# Patient Record
Sex: MALE | NOT HISPANIC OR LATINO | Employment: FULL TIME | URBAN - NONMETROPOLITAN AREA
[De-identification: names, ages, dates, MRNs, and addresses within clinical notes are randomized per-mention and may not be internally consistent; named-entity substitution may affect disease eponyms.]

---

## 2019-10-01 ENCOUNTER — NON-PROVIDER VISIT (OUTPATIENT)
Dept: URGENT CARE | Facility: PHYSICIAN GROUP | Age: 49
End: 2019-10-01

## 2019-10-01 DIAGNOSIS — Z11.1 PPD SCREENING TEST: Primary | ICD-10-CM

## 2019-10-01 PROCEDURE — 86580 TB INTRADERMAL TEST: CPT | Performed by: PHYSICIAN ASSISTANT

## 2019-10-03 ENCOUNTER — NON-PROVIDER VISIT (OUTPATIENT)
Dept: URGENT CARE | Facility: PHYSICIAN GROUP | Age: 49
End: 2019-10-03

## 2019-10-03 LAB — TB WHEAL 3D P 5 TU DIAM: NORMAL MM

## 2022-02-02 ENCOUNTER — HOSPITAL ENCOUNTER (OUTPATIENT)
Dept: LAB | Facility: MEDICAL CENTER | Age: 52
End: 2022-02-02
Attending: INTERNAL MEDICINE
Payer: COMMERCIAL

## 2022-02-02 LAB
ALBUMIN SERPL BCP-MCNC: 4.6 G/DL (ref 3.2–4.9)
ALBUMIN/GLOB SERPL: 1.4 G/DL
ALP SERPL-CCNC: 100 U/L (ref 30–99)
ALT SERPL-CCNC: 28 U/L (ref 2–50)
ANION GAP SERPL CALC-SCNC: 9 MMOL/L (ref 7–16)
AST SERPL-CCNC: 29 U/L (ref 12–45)
BASOPHILS # BLD AUTO: 1.1 % (ref 0–1.8)
BASOPHILS # BLD: 0.07 K/UL (ref 0–0.12)
BILIRUB SERPL-MCNC: 0.2 MG/DL (ref 0.1–1.5)
BUN SERPL-MCNC: 11 MG/DL (ref 8–22)
CALCIUM SERPL-MCNC: 9.3 MG/DL (ref 8.5–10.5)
CHLORIDE SERPL-SCNC: 102 MMOL/L (ref 96–112)
CHOLEST SERPL-MCNC: 316 MG/DL (ref 100–199)
CO2 SERPL-SCNC: 26 MMOL/L (ref 20–33)
CREAT SERPL-MCNC: 1.01 MG/DL (ref 0.5–1.4)
EOSINOPHIL # BLD AUTO: 0.4 K/UL (ref 0–0.51)
EOSINOPHIL NFR BLD: 6.2 % (ref 0–6.9)
ERYTHROCYTE [DISTWIDTH] IN BLOOD BY AUTOMATED COUNT: 47.5 FL (ref 35.9–50)
FASTING STATUS PATIENT QL REPORTED: NORMAL
GLOBULIN SER CALC-MCNC: 3.4 G/DL (ref 1.9–3.5)
GLUCOSE SERPL-MCNC: 102 MG/DL (ref 65–99)
HCT VFR BLD AUTO: 56 % (ref 42–52)
HDLC SERPL-MCNC: 47 MG/DL
HGB BLD-MCNC: 19.3 G/DL (ref 14–18)
IMM GRANULOCYTES # BLD AUTO: 0.03 K/UL (ref 0–0.11)
IMM GRANULOCYTES NFR BLD AUTO: 0.5 % (ref 0–0.9)
LDLC SERPL CALC-MCNC: 232 MG/DL
LYMPHOCYTES # BLD AUTO: 2.15 K/UL (ref 1–4.8)
LYMPHOCYTES NFR BLD: 33.4 % (ref 22–41)
MCH RBC QN AUTO: 34.8 PG (ref 27–33)
MCHC RBC AUTO-ENTMCNC: 34.5 G/DL (ref 33.7–35.3)
MCV RBC AUTO: 101.1 FL (ref 81.4–97.8)
MONOCYTES # BLD AUTO: 0.64 K/UL (ref 0–0.85)
MONOCYTES NFR BLD AUTO: 10 % (ref 0–13.4)
NEUTROPHILS # BLD AUTO: 3.14 K/UL (ref 1.82–7.42)
NEUTROPHILS NFR BLD: 48.8 % (ref 44–72)
NRBC # BLD AUTO: 0 K/UL
NRBC BLD-RTO: 0 /100 WBC
PLATELET # BLD AUTO: 209 K/UL (ref 164–446)
PMV BLD AUTO: 11.1 FL (ref 9–12.9)
POTASSIUM SERPL-SCNC: 4.8 MMOL/L (ref 3.6–5.5)
PROT SERPL-MCNC: 8 G/DL (ref 6–8.2)
RBC # BLD AUTO: 5.54 M/UL (ref 4.7–6.1)
SODIUM SERPL-SCNC: 137 MMOL/L (ref 135–145)
TRIGL SERPL-MCNC: 185 MG/DL (ref 0–149)
TSH SERPL DL<=0.005 MIU/L-ACNC: 4.53 UIU/ML (ref 0.38–5.33)
WBC # BLD AUTO: 6.4 K/UL (ref 4.8–10.8)

## 2022-02-02 PROCEDURE — 36415 COLL VENOUS BLD VENIPUNCTURE: CPT

## 2022-02-02 PROCEDURE — 80061 LIPID PANEL: CPT

## 2022-02-02 PROCEDURE — 85025 COMPLETE CBC W/AUTO DIFF WBC: CPT

## 2022-02-02 PROCEDURE — 80053 COMPREHEN METABOLIC PANEL: CPT

## 2022-02-02 PROCEDURE — 84443 ASSAY THYROID STIM HORMONE: CPT

## 2023-02-06 ENCOUNTER — HOSPITAL ENCOUNTER (OUTPATIENT)
Dept: LAB | Facility: MEDICAL CENTER | Age: 53
End: 2023-02-06
Attending: INTERNAL MEDICINE
Payer: COMMERCIAL

## 2023-02-06 LAB
ALBUMIN SERPL BCP-MCNC: 4 G/DL (ref 3.2–4.9)
ALBUMIN/GLOB SERPL: 1.3 G/DL
ALP SERPL-CCNC: 89 U/L (ref 30–99)
ALT SERPL-CCNC: 22 U/L (ref 2–50)
ANION GAP SERPL CALC-SCNC: 9 MMOL/L (ref 7–16)
AST SERPL-CCNC: 28 U/L (ref 12–45)
BASOPHILS # BLD AUTO: 1 % (ref 0–1.8)
BASOPHILS # BLD: 0.06 K/UL (ref 0–0.12)
BILIRUB SERPL-MCNC: <0.2 MG/DL (ref 0.1–1.5)
BUN SERPL-MCNC: 19 MG/DL (ref 8–22)
CALCIUM ALBUM COR SERPL-MCNC: 9.1 MG/DL (ref 8.5–10.5)
CALCIUM SERPL-MCNC: 9.1 MG/DL (ref 8.5–10.5)
CHLORIDE SERPL-SCNC: 102 MMOL/L (ref 96–112)
CHOLEST SERPL-MCNC: 134 MG/DL (ref 100–199)
CO2 SERPL-SCNC: 28 MMOL/L (ref 20–33)
CREAT SERPL-MCNC: 0.93 MG/DL (ref 0.5–1.4)
EOSINOPHIL # BLD AUTO: 0.28 K/UL (ref 0–0.51)
EOSINOPHIL NFR BLD: 4.4 % (ref 0–6.9)
ERYTHROCYTE [DISTWIDTH] IN BLOOD BY AUTOMATED COUNT: 44.9 FL (ref 35.9–50)
FASTING STATUS PATIENT QL REPORTED: NORMAL
GFR SERPLBLD CREATININE-BSD FMLA CKD-EPI: 98 ML/MIN/1.73 M 2
GLOBULIN SER CALC-MCNC: 3.1 G/DL (ref 1.9–3.5)
GLUCOSE SERPL-MCNC: 109 MG/DL (ref 65–99)
HCT VFR BLD AUTO: 47.4 % (ref 42–52)
HDLC SERPL-MCNC: 34 MG/DL
HGB BLD-MCNC: 16.2 G/DL (ref 14–18)
IMM GRANULOCYTES # BLD AUTO: 0.03 K/UL (ref 0–0.11)
IMM GRANULOCYTES NFR BLD AUTO: 0.5 % (ref 0–0.9)
LDLC SERPL CALC-MCNC: 66 MG/DL
LYMPHOCYTES # BLD AUTO: 2.35 K/UL (ref 1–4.8)
LYMPHOCYTES NFR BLD: 37.3 % (ref 22–41)
MCH RBC QN AUTO: 33.7 PG (ref 27–33)
MCHC RBC AUTO-ENTMCNC: 34.2 G/DL (ref 33.7–35.3)
MCV RBC AUTO: 98.5 FL (ref 81.4–97.8)
MONOCYTES # BLD AUTO: 0.77 K/UL (ref 0–0.85)
MONOCYTES NFR BLD AUTO: 12.2 % (ref 0–13.4)
NEUTROPHILS # BLD AUTO: 2.81 K/UL (ref 1.82–7.42)
NEUTROPHILS NFR BLD: 44.6 % (ref 44–72)
NRBC # BLD AUTO: 0 K/UL
NRBC BLD-RTO: 0 /100 WBC
PLATELET # BLD AUTO: 206 K/UL (ref 164–446)
PMV BLD AUTO: 11.3 FL (ref 9–12.9)
POTASSIUM SERPL-SCNC: 4 MMOL/L (ref 3.6–5.5)
PROT SERPL-MCNC: 7.1 G/DL (ref 6–8.2)
RBC # BLD AUTO: 4.81 M/UL (ref 4.7–6.1)
SODIUM SERPL-SCNC: 139 MMOL/L (ref 135–145)
TRIGL SERPL-MCNC: 170 MG/DL (ref 0–149)
TSH SERPL DL<=0.005 MIU/L-ACNC: 2.49 UIU/ML (ref 0.38–5.33)
WBC # BLD AUTO: 6.3 K/UL (ref 4.8–10.8)

## 2023-02-06 PROCEDURE — 84443 ASSAY THYROID STIM HORMONE: CPT

## 2023-02-06 PROCEDURE — 36415 COLL VENOUS BLD VENIPUNCTURE: CPT

## 2023-02-06 PROCEDURE — 80061 LIPID PANEL: CPT

## 2023-02-06 PROCEDURE — 85025 COMPLETE CBC W/AUTO DIFF WBC: CPT

## 2023-02-06 PROCEDURE — 80053 COMPREHEN METABOLIC PANEL: CPT

## 2024-03-17 ENCOUNTER — APPOINTMENT (OUTPATIENT)
Dept: RADIOLOGY | Facility: IMAGING CENTER | Age: 54
End: 2024-03-17
Attending: FAMILY MEDICINE
Payer: COMMERCIAL

## 2024-03-17 ENCOUNTER — OFFICE VISIT (OUTPATIENT)
Dept: URGENT CARE | Facility: PHYSICIAN GROUP | Age: 54
End: 2024-03-17
Payer: COMMERCIAL

## 2024-03-17 VITALS
OXYGEN SATURATION: 98 % | HEART RATE: 81 BPM | TEMPERATURE: 97.3 F | RESPIRATION RATE: 16 BRPM | SYSTOLIC BLOOD PRESSURE: 138 MMHG | DIASTOLIC BLOOD PRESSURE: 82 MMHG

## 2024-03-17 DIAGNOSIS — M25.511 ACUTE PAIN OF RIGHT SHOULDER: ICD-10-CM

## 2024-03-17 DIAGNOSIS — M54.16 LUMBAR RADICULOPATHY: ICD-10-CM
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
## 2024-03-17 DIAGNOSIS — M25.561 ACUTE PAIN OF RIGHT KNEE: ICD-10-CM

## 2024-03-17 DIAGNOSIS — M25.551 PAIN OF RIGHT HIP: ICD-10-CM

## 2024-03-17 PROCEDURE — 73030 X-RAY EXAM OF SHOULDER: CPT | Mod: TC,FY,RT | Performed by: FAMILY MEDICINE

## 2024-03-17 PROCEDURE — 73502 X-RAY EXAM HIP UNI 2-3 VIEWS: CPT | Mod: TC,FY,RT | Performed by: FAMILY MEDICINE

## 2024-03-17 PROCEDURE — 99204 OFFICE O/P NEW MOD 45 MIN: CPT | Performed by: FAMILY MEDICINE

## 2024-03-17 PROCEDURE — 73562 X-RAY EXAM OF KNEE 3: CPT | Mod: TC,FY,RT | Performed by: FAMILY MEDICINE

## 2024-03-17 PROCEDURE — 3079F DIAST BP 80-89 MM HG: CPT | Performed by: FAMILY MEDICINE

## 2024-03-17 PROCEDURE — 3075F SYST BP GE 130 - 139MM HG: CPT | Performed by: FAMILY MEDICINE

## 2024-03-17 RX ORDER — OXYCODONE AND ACETAMINOPHEN 10; 325 MG/1; MG/1
1 TABLET ORAL EVERY 4 HOURS PRN
COMMUNITY

## 2024-03-17 RX ORDER — METHYLPREDNISOLONE 4 MG/1
TABLET ORAL
Qty: 21 TABLET | Refills: 0 | Status: SHIPPED | OUTPATIENT
Start: 2024-03-17

## 2024-03-17 RX ORDER — METHOCARBAMOL 750 MG/1
750 TABLET, FILM COATED ORAL 4 TIMES DAILY
COMMUNITY

## 2024-03-17 RX ORDER — CYCLOBENZAPRINE HCL 10 MG
10 TABLET ORAL 3 TIMES DAILY PRN
Qty: 30 TABLET | Refills: 0 | Status: SHIPPED | OUTPATIENT
Start: 2024-03-17

## 2024-03-17 RX ORDER — ATORVASTATIN CALCIUM 40 MG/1
TABLET, FILM COATED ORAL
COMMUNITY
Start: 2024-02-16

## 2024-03-17 RX ORDER — KETOROLAC TROMETHAMINE 30 MG/ML
15 INJECTION, SOLUTION INTRAMUSCULAR; INTRAVENOUS ONCE
Status: COMPLETED | OUTPATIENT
Start: 2024-03-17 | End: 2024-03-17

## 2024-03-17 RX ORDER — DEXTROAMPHETAMINE SACCHARATE, AMPHETAMINE ASPARTATE, DEXTROAMPHETAMINE SULFATE AND AMPHETAMINE SULFATE 2.5; 2.5; 2.5; 2.5 MG/1; MG/1; MG/1; MG/1
TABLET ORAL
COMMUNITY
Start: 2024-03-15

## 2024-03-17 RX ORDER — APIXABAN 2.5 MG/1
TABLET, FILM COATED ORAL
COMMUNITY
Start: 2024-01-26

## 2024-03-17 RX ADMIN — KETOROLAC TROMETHAMINE 15 MG: 30 INJECTION, SOLUTION INTRAMUSCULAR; INTRAVENOUS at 16:39

## 2024-03-17 NOTE — PROGRESS NOTES
Chief Complaint   Patient presents with    Leg Pain     R leg pain, ongoing issue, would like a steroid shot, lower back pain radiates to toes, constant pressure and pounding     Shoulder Pain     R shoulder and shoulder blade pain feels like he has a knot          Back Pain  This is a chronic  problem.    Pt is currently under the care of pain management.       He comes to in  c/o worsening back pain over past several weeks.         He has sciaticia.    Lumbar spine multilevel fusion 2015    Per pt, he recently had MRI, lumbar spine.    Per pt, no significant nerve compression, although I do not have MRI images or radiologist report, since it was performed at [facility].     Reports worsening of the rt sided radiculopathy.          He also had a ground level fall 5 months ago, landed on rt side, and has had pain in rt shoulder, rt hip, rt knee since then.      He takes 2-3 10mg percocet daily and this helps, but does not completely eliminate the pain     Denies incontinence or saddle anesthesia.                      Review of Systems   Constitutional: Negative for fever, chills and malaise/fatigue.   Eyes: Negative for vision changes, d/c.    Respiratory: Negative for cough and sputum production.    Cardiovascular: Negative for chest pain and palpitations.   Gastrointestinal: Negative for nausea, vomiting, abdominal pain, diarrhea and constipation.   Genitourinary: Negative for dysuria, urgency and frequency.   Skin: Negative for rash or  itching.   Neurological: Negative for dizziness and headaches.   Psychiatric/Behavioral: Negative for depression.   Hematologic/lymphatic - denies bruising or excessive bleeding  All other systems reviewed and are negative.         Objective:      /82   Pulse 81   Temp 36.3 °C (97.3 °F) (Temporal)   Resp 16   SpO2 98%       Physical Exam   Constitutional: pt is oriented to person, place, and time. Pt appears well-developed and well-nourished. No distress.    HENT:   Head: Normocephalic and atraumatic.   Eyes: EOM are normal. Pupils are equal, round, and reactive to light. No scleral icterus.   Neck: Normal range of motion. Neck supple. No thyromegaly present.   Cardiovascular: Normal rate, regular rhythm and normal heart sounds.    Pulmonary/Chest: Effort normal and breath sounds normal. No respiratory distress.  no wheezes.   no rales.  no tenderness.   Musculoskeletal: pt exhibits no LE edema.                  Lumbar spine: pt exhibits decreased range of motion, spasm and right sided tenderness .   + previous vertical surgical scar noted        Left knee:   + full AROM.   + TTP over medial knee.   Varus and valgus stress tests negative. Lachman, anterior/posterior drawer test negative    Left hip:   + TTP  over greater trochanter.    Full AROM    Left shoulder:   full AROM.    + TTP over acromion.   + upper trapezius muscle spasm      Neurological: patient is alert and oriented to person, place, and time.       Skin: Skin is warm and dry. No rash noted. No erythema.   Psychiatric: patient has a normal mood and affect.  behavior is normal.   Nursing note and vitals reviewed.    Details    Reading Physician Reading Date Result Priority   Giovanni Paez M.D.  [phone] 3/17/2024      Narrative & Impression     3/17/2024 4:35 PM     HISTORY/REASON FOR EXAM:  Atraumatic Pain/Swelling/Deformity.  Knee pain     TECHNIQUE/EXAM DESCRIPTION AND NUMBER OF VIEWS:  3 views of the RIGHT knee.     COMPARISON: None     FINDINGS:  There is no fracture or dislocation.  The visualized osseous structures are in anatomic alignment.  Joint spaces are preserved.  Bone mineralization is age-appropriate..  No significant joint effusion.     IMPRESSION:     No significant abnormality.           Exam Ended: 03/17/24  4:54 PM Last Resulted: 03/17/24  4:58 PM                 Details    Reading Physician Reading Date Result Priority   Giovanni Paez M.D.  [phone] 3/17/2024      Narrative  & Impression     3/17/2024 4:35 PM     HISTORY/REASON FOR EXAM:  Atraumatic Pain/Swelling/Deformity.  Knee pain     TECHNIQUE/EXAM DESCRIPTION AND NUMBER OF VIEWS:  3 views of the RIGHT knee.     COMPARISON: None     FINDINGS:  There is no fracture or dislocation.  The visualized osseous structures are in anatomic alignment.  Joint spaces are preserved.  Bone mineralization is age-appropriate..  No significant joint effusion.     IMPRESSION:     No significant abnormality.           Exam Ended: 03/17/24  4:54 PM Last Resulted: 03/17/24  4:58 PM              Reading Physician Reading Date Result Priority   Giovanni Paez M.D.  [phone] 3/17/2024      Narrative & Impression     3/17/2024 4:35 PM     HISTORY/REASON FOR EXAM:  Pelvic/Hip Pain Following Trauma.        TECHNIQUE/EXAM DESCRIPTION AND NUMBER OF VIEWS:  3 views of the RIGHT hip.     COMPARISON: None     FINDINGS:  There is no fracture or dislocation.  The visualized osseous structures are in anatomic alignment.  There are advanced degenerative changes of the right hip and moderate of the left hip.  There are postsurgical changes of the lower lumbar spine.  Bone mineralization is age-appropriate..        IMPRESSION:     No acute fracture or dislocation.     Degenerative changes of the hips, severe on the right and moderate on the left.           Exam Ended: 03/17/24  4:53 PM Last Resulted: 03/17/24  4:59 PM           Assessment/Plan:             1. Hip pain      X-rays were personally reviewed by myself.   There is no fracture, just arthritic changes as noted above.           2. Lumbar radiculopathy   Acute flare of chronic condition    - ketorolac (Toradol) injection 15 mg  - methylPREDNISolone (MEDROL DOSEPAK) 4 MG Tablet Therapy Pack; Follow schedule on package instructions.  Dispense: 21 Tablet; Refill: 0  - cyclobenzaprine (FLEXERIL) 10 mg Tab; Take 1 Tablet by mouth 3 times a day as needed for Moderate Pain.  Dispense: 30 Tablet; Refill:  0      Advised f/u dr. Hayes        3. Acute pain of right knee   X-rays were personally reviewed by myself.   There is no fracture or arthritic changes      - MR-KNEE-W/O RIGHT; Future     4.  Shoulder pain      X-rays were personally reviewed by myself.   There is no fracture or arthritic changes   noted.            Differential diagnosis, natural history, supportive care, and indications for immediate follow-up discussed. All questions answered. Patient agrees with the plan of care.     Follow-up as needed if symptoms worsen or fail to improve to PCP, Urgent care or Emergency Room.     I have personally reviewed prior external notes and test results pertinent to today's visit.  I have independently reviewed and interpreted all diagnostics ordered during this urgent care acute visit.

## 2024-04-03 ENCOUNTER — OFFICE VISIT (OUTPATIENT)
Dept: URGENT CARE | Facility: PHYSICIAN GROUP | Age: 54
End: 2024-04-03
Payer: COMMERCIAL

## 2024-04-03 VITALS
TEMPERATURE: 97.8 F | HEART RATE: 104 BPM | OXYGEN SATURATION: 97 % | HEIGHT: 70 IN | WEIGHT: 235 LBS | SYSTOLIC BLOOD PRESSURE: 132 MMHG | DIASTOLIC BLOOD PRESSURE: 78 MMHG | RESPIRATION RATE: 16 BRPM | BODY MASS INDEX: 33.64 KG/M2

## 2024-04-03 DIAGNOSIS — M25.551 PAIN OF RIGHT HIP: ICD-10-CM

## 2024-04-03 DIAGNOSIS — M54.16 LUMBAR RADICULOPATHY: ICD-10-CM

## 2024-04-03 DIAGNOSIS — M25.511 ACUTE PAIN OF RIGHT SHOULDER: ICD-10-CM

## 2024-04-03 PROCEDURE — 3075F SYST BP GE 130 - 139MM HG: CPT

## 2024-04-03 PROCEDURE — 99213 OFFICE O/P EST LOW 20 MIN: CPT

## 2024-04-03 PROCEDURE — 1125F AMNT PAIN NOTED PAIN PRSNT: CPT

## 2024-04-03 PROCEDURE — 3078F DIAST BP <80 MM HG: CPT

## 2024-04-03 RX ORDER — CYCLOBENZAPRINE HCL 10 MG
10 TABLET ORAL 3 TIMES DAILY PRN
Qty: 30 TABLET | Refills: 0 | Status: SHIPPED | OUTPATIENT
Start: 2024-04-03

## 2024-04-03 RX ORDER — METHYLPREDNISOLONE 4 MG/1
TABLET ORAL
Qty: 21 TABLET | Refills: 0 | Status: SHIPPED | OUTPATIENT
Start: 2024-04-03

## 2024-04-03 ASSESSMENT — ENCOUNTER SYMPTOMS
FEVER: 0
CHILLS: 0
BACK PAIN: 1
DIAPHORESIS: 0
WEIGHT LOSS: 0

## 2024-04-03 ASSESSMENT — PAIN SCALES - GENERAL: PAINLEVEL: 9=SEVERE PAIN

## 2024-04-03 ASSESSMENT — FIBROSIS 4 INDEX: FIB4 SCORE: 1.54

## 2024-04-03 NOTE — PROGRESS NOTES
CHIEF COMPLAINT  Chief Complaint   Patient presents with    Medication Refill     Requesting refill of Flexeril 10mg tablet     Subjective:   Triston Galindo Jr. is a 53 y.o. male who presents to urgent care requesting a refill of his Flexeril 10 mg tablet.  Patient reports he was previously seen in urgent care approximately 3 weeks ago for symptoms of intermittent right arm pain and right leg pain.  At that time patient was diagnosed with a lumbar radiculopathy.  He does report a pertinent past medical history of 3 back surgeries including a fusion in the past.  He reports that his job is extremely physical and often exacerbates his pain and symptoms.  He does report significant improvement with use of Flexeril at night as well as after he received a Medrol Dosepak.  Patient reports he does have an appointment scheduled with primary care in a week for further follow-up and evaluation of his symptoms.  He also has a pending MRI that he plans on scheduling today.      Review of Systems   Constitutional:  Negative for chills, diaphoresis, fever, malaise/fatigue and weight loss.   Musculoskeletal:  Positive for back pain.       PAST MEDICAL HISTORY  There are no problems to display for this patient.      SURGICAL HISTORY  patient denies any surgical history    ALLERGIES  Allergies   Allergen Reactions    Penicillins Anaphylaxis     Throat swells and rash       CURRENT MEDICATIONS  Home Medications       Reviewed by Atul Vale Ass't (Medical Assistant) on 04/03/24 at 1032  Med List Status: <None>     Medication Last Dose Status   amphetamine-dextroamphetamine (ADDERALL) 10 MG Tab Taking Active   atorvastatin (LIPITOR) 40 MG Tab Not Taking Active   cyclobenzaprine (FLEXERIL) 10 mg Tab Taking Active   ELIQUIS 2.5 MG Tab Taking Active   methocarbamol (ROBAXIN) 750 MG Tab Taking Active   methylPREDNISolone (MEDROL DOSEPAK) 4 MG Tablet Therapy Pack Not Taking Active   oxyCODONE-acetaminophen (PERCOCET-10)  " MG Tab Taking Active                    SOCIAL HISTORY  Social History     Tobacco Use    Smoking status: Never    Smokeless tobacco: Never   Vaping Use    Vaping Use: Never used   Substance and Sexual Activity    Alcohol use: Never    Drug use: Never    Sexual activity: Not on file       FAMILY HISTORY  History reviewed. No pertinent family history.      Medications, Allergies, and current problem list reviewed today in Epic.     Objective:     /78   Pulse (!) 104   Temp 36.6 °C (97.8 °F) (Temporal)   Resp 16   Ht 1.778 m (5' 10\")   Wt 107 kg (235 lb)   SpO2 97%     Physical Exam  Vitals reviewed.   Constitutional:       General: He is not in acute distress.     Appearance: Normal appearance. He is normal weight. He is not ill-appearing or toxic-appearing.   HENT:      Head: Normocephalic.   Cardiovascular:      Rate and Rhythm: Normal rate and regular rhythm.      Pulses: Normal pulses.      Heart sounds: Normal heart sounds.   Pulmonary:      Effort: Pulmonary effort is normal. No respiratory distress.      Breath sounds: Normal breath sounds. No stridor. No wheezing, rhonchi or rales.   Musculoskeletal:      Cervical back: Normal range of motion and neck supple. No rigidity.      Right hip: Tenderness present. No deformity, bony tenderness or crepitus. Normal range of motion. Normal strength.      Right knee: Normal.      Comments: Back: Decreased ROM of flexion, extension, rotation, lateralization do to pain  Positive spasm to right trapezious  Negative midline tenderness, bony tenderness, crepitus, deformities, or step-offs.  HIP: TTP to greater trochanter. Normal ROM   Lymphadenopathy:      Cervical: No cervical adenopathy.   Skin:     General: Skin is warm.      Capillary Refill: Capillary refill takes less than 2 seconds.   Neurological:      General: No focal deficit present.      Mental Status: He is alert.   Psychiatric:         Mood and Affect: Mood normal.         Assessment/Plan: "     Diagnosis and associated orders:     1. Lumbar radiculopathy  methylPREDNISolone (MEDROL DOSEPAK) 4 MG Tablet Therapy Pack    cyclobenzaprine (FLEXERIL) 10 mg Tab      2. Acute pain of right shoulder        3. Pain of right hip           Comments/MDM:     Upon physical exam patient is alert no apparent signs of distress.  Patient does have limited ROM to back related to pain.  Positive spasm to right trapezius.  Negative for any midline tenderness, bony tenderness or crepitus.  No deformities or step-offs appreciated.  TTP to greater trochanter.  Normal range of motion to hip.  Vital signs are stable.  Refills prescribed for Flexeril and Medrol Dosepak as patient does report significant improvement after last visit.  Advised patient on sedative nature of Flexeril.  Patient does report follow-up scheduled with primary care later this month for further evaluation and management.  Follow-up with MRI as scheduled  Red flag signs and symptoms discussed.  Instructed to return to ER or urgent care if symptoms worsen or fail to improve.         Differential diagnosis, natural history, supportive care, and indications for immediate follow-up discussed.    Advised the patient to follow-up with the primary care physician for recheck, reevaluation, and consideration of further management.    Please note that this dictation was created using voice recognition software. I have made a reasonable attempt to correct obvious errors, but I expect that there are errors of grammar and possibly content that I did not discover before finalizing the note.    This note was electronically signed by DIOGENES Arellano

## 2024-04-07 ENCOUNTER — HOSPITAL ENCOUNTER (OUTPATIENT)
Dept: RADIOLOGY | Facility: MEDICAL CENTER | Age: 54
End: 2024-04-07
Attending: FAMILY MEDICINE
Payer: COMMERCIAL

## 2024-04-07 DIAGNOSIS — M25.561 ACUTE PAIN OF RIGHT KNEE: ICD-10-CM

## 2024-04-07 PROCEDURE — 73721 MRI JNT OF LWR EXTRE W/O DYE: CPT | Mod: RT

## 2024-04-08 ENCOUNTER — OFFICE VISIT (OUTPATIENT)
Dept: URGENT CARE | Facility: CLINIC | Age: 54
End: 2024-04-08
Payer: COMMERCIAL

## 2024-04-08 VITALS
TEMPERATURE: 98.2 F | OXYGEN SATURATION: 98 % | SYSTOLIC BLOOD PRESSURE: 124 MMHG | HEIGHT: 70 IN | RESPIRATION RATE: 14 BRPM | BODY MASS INDEX: 34.12 KG/M2 | HEART RATE: 107 BPM | DIASTOLIC BLOOD PRESSURE: 86 MMHG | WEIGHT: 238.32 LBS

## 2024-04-08 DIAGNOSIS — M25.561 CHRONIC PAIN OF RIGHT KNEE: Primary | ICD-10-CM

## 2024-04-08 DIAGNOSIS — G89.29 CHRONIC PAIN OF RIGHT KNEE: Primary | ICD-10-CM

## 2024-04-08 PROCEDURE — 3079F DIAST BP 80-89 MM HG: CPT

## 2024-04-08 PROCEDURE — 3074F SYST BP LT 130 MM HG: CPT

## 2024-04-08 PROCEDURE — 99213 OFFICE O/P EST LOW 20 MIN: CPT

## 2024-04-08 RX ORDER — CYCLOBENZAPRINE HCL 10 MG
10 TABLET ORAL 3 TIMES DAILY PRN
Qty: 30 TABLET | Refills: 0 | Status: SHIPPED | OUTPATIENT
Start: 2024-04-13

## 2024-04-08 RX ORDER — LIDOCAINE 50 MG/G
1 PATCH TOPICAL EVERY 24 HOURS
Qty: 10 PATCH | Refills: 1 | Status: SHIPPED | OUTPATIENT
Start: 2024-04-08

## 2024-04-08 ASSESSMENT — ENCOUNTER SYMPTOMS
SPUTUM PRODUCTION: 0
DIARRHEA: 0
STRIDOR: 0
SORE THROAT: 0
SHORTNESS OF BREATH: 0
DIZZINESS: 0
PALPITATIONS: 0
FEVER: 0
VOMITING: 0
EYE PAIN: 0
CHILLS: 0
NAUSEA: 0
EYE REDNESS: 0
WHEEZING: 0
HEADACHES: 0
COUGH: 0
MYALGIAS: 0
EYE DISCHARGE: 0
ABDOMINAL PAIN: 0

## 2024-04-08 ASSESSMENT — FIBROSIS 4 INDEX: FIB4 SCORE: 1.54

## 2024-04-08 NOTE — PROGRESS NOTES
"Subjective:   Triston Galindo Jr. is a 53 y.o. male who presents for Knee Pain (R knee )          I introduced myself to the patient and informed them that I am a family nurse practitioner.    HPI:Triston  comes in today c/o fell onto his R side in November 2023, since then he has been experiencing R knee pain that is a 9/10. Patient describes symptoms as constant. They describe the pain as sharp, aching, radiates down his shin sometimes. Aggravating factors include walking, standing, working. Relieving factors include rest, ice. Treatments tried at home include takes percocet  mg 3 x day and diclofenac gel twice a day that were given to him by Dr. Hayes of pain management due to back pain issues he has had in the past, also has tried ice frequently with some relief.  He understands he is to avoid p.o. NSAIDs due to being on Eliquis.  He states he has tried heat but this just irritates his skin and does not help his pain. They describe their symptoms as severe. Has appointment with pain management Dr. Hayes on 04/22/2024. States that the flexeril he was given at last  appointment helped a lot but he will run out of it before he sees Dr. Hayes and is asking for a refill.  He is also asking for something for the pain today to give him relief.  He has been given both Gabapentin and Lyrica in the past but \"didn't like the way they made him feel\" and so stopped taking them.  He denies ever having tried Lidoderm patches.  He denies ever having tried a knee stabilizing brace.      Review of Systems   Constitutional:  Negative for chills, fever and malaise/fatigue.   HENT:  Negative for congestion, ear pain and sore throat.    Eyes:  Negative for pain, discharge and redness.   Respiratory:  Negative for cough, sputum production, shortness of breath, wheezing and stridor.    Cardiovascular:  Negative for chest pain and palpitations.   Gastrointestinal:  Negative for abdominal pain, diarrhea, nausea and vomiting. " "  Genitourinary:  Negative for dysuria.   Musculoskeletal:  Positive for joint pain. Negative for myalgias.        Positive for chronic right knee pain   Skin:  Negative for rash.   Neurological:  Negative for dizziness and headaches.       Medications: amphetamine-dextroamphetamine Tabs  atorvastatin Tabs  cyclobenzaprine Tabs  Eliquis Tabs  methylPREDNISolone Tbpk  oxyCODONE-acetaminophen Tabs     Allergies: Penicillins    Problem List: does not have a problem list on file.    Surgical History:  Past Surgical History:   Procedure Laterality Date    FUSION, SPINE, LUMBAR, PLIF      3 back surgeries total       Past Social Hx:   reports that he has never smoked. He has never used smokeless tobacco. He reports that he does not drink alcohol and does not use drugs.     Past Family Hx:   family history is not on file.     Problem list, medications, and allergies reviewed by myself today in Epic.   I have documented what I find to be significant in regards to past medical, social, family and surgical history  in my HPI or under PMH/PSH/FH review section, otherwise it is noncontributory     Objective:     /86   Pulse (!) 107   Temp 36.8 °C (98.2 °F) (Temporal)   Resp 14   Ht 1.778 m (5' 10\")   Wt 108 kg (238 lb 5.1 oz)   SpO2 98%   BMI 34.20 kg/m²     During this visit, appropriate PPE was worn, and hand hygiene was performed.    Physical Exam  Vitals reviewed.   Constitutional:       General: He is not in acute distress.     Appearance: Normal appearance. He is not ill-appearing or toxic-appearing.   HENT:      Head: Normocephalic and atraumatic.      Right Ear: External ear normal.      Left Ear: External ear normal.      Nose: No congestion or rhinorrhea.   Eyes:      General: No scleral icterus.        Right eye: No discharge.         Left eye: No discharge.      Extraocular Movements: Extraocular movements intact.      Conjunctiva/sclera: Conjunctivae normal.   Cardiovascular:      Rate and Rhythm: " Normal rate.   Pulmonary:      Effort: Pulmonary effort is normal.   Abdominal:      General: There is no distension.      Palpations: Abdomen is soft.   Musculoskeletal:         General: Normal range of motion.      Cervical back: Normal range of motion. No rigidity.      Right lower leg: No edema.      Left lower leg: No edema.      Comments: Patient does have a mildly antalgic gait with a slight limp on the right.  There is TTP to the lateral aspect of the right knee, no obvious bony deformities or step-offs palpated.  Posterior and anterior drawer tests are negative.  NVID   Skin:     General: Skin is warm and dry.      Capillary Refill: Capillary refill takes 2 to 3 seconds.      Coloration: Skin is not jaundiced.   Neurological:      General: No focal deficit present.      Mental Status: He is alert and oriented to person, place, and time. Mental status is at baseline.      Gait: Gait normal.   Psychiatric:         Mood and Affect: Mood normal.         Behavior: Behavior normal.         Thought Content: Thought content normal.         Judgment: Judgment normal.     The following radiology results were reviewed by me during this visit today -     RADIOLOGY RESULTS   MR-KNEE-W/O RIGHT    Result Date: 4/8/2024 4/7/2024 5:18 PM HISTORY/REASON FOR EXAM:  pain; please call STAT report to Dr. Pompa on voalte me; Knee pain no trauma TECHNIQUE/EXAM DESCRIPTION: MRI of the RIGHT knee without contrast. The study was performed on a "[employer] Signa 1.5 Liberty MRI scanner. T1 coronal, proton density fat-suppressed coronal, fast spin-echo T2 fat-suppressed axial, intermediate fast spin-echo sagittal, and intermediate fast spin-echo fat-suppressed thin-section sagittal images were obtained. COMPARISON: Conventional radiographs dated 3/17/2024 FINDINGS: There is minimal joint fluid and a tiny Baker's cyst. The anterior and posterior cruciate ligaments as well as the medial and lateral collateral ligament complexes are intact.  There is minimal patellar tendinosis. The quadriceps mechanism and iliotibial band are intact. There is intrameniscal signal noted within the posterior horn of the medial meniscus. There is questionable extension to the superior articular surface on sagittal proton density image #18. Minimal fraying in this region cannot be completely excluded. There is also some heterogeneity of the posterior horn of the lateral meniscus at the meniscal root. Evaluation this region is limited by volume averaging and magic angle artifact along with some motion. It would be difficult to exclude mild undersurface  fraying. Otherwise, no definite meniscal tear is appreciated. There is mild chondromalacia and chondral thinning of the medial facet of the patella. There is no fracture, dislocation, or evidence for osteonecrosis.     1. Ligaments are intact. 2. Mild chondromalacia patella. 3. There is no definite meniscal tear although it would be difficult to fully exclude subtle fraying of the posterior horns.    DX-KNEE 3 VIEWS Atraumatic Pain/Swelling/Deformity    Result Date: 3/17/2024  3/17/2024 4:35 PM HISTORY/REASON FOR EXAM:  Atraumatic Pain/Swelling/Deformity. Knee pain TECHNIQUE/EXAM DESCRIPTION AND NUMBER OF VIEWS:  3 views of the RIGHT knee. COMPARISON: None FINDINGS: There is no fracture or dislocation. The visualized osseous structures are in anatomic alignment. Joint spaces are preserved. Bone mineralization is age-appropriate.. No significant joint effusion.     No significant abnormality.            Assessment/Plan:     Diagnosis and associated orders:     1. Chronic pain of right knee  cyclobenzaprine (FLEXERIL) 10 mg Tab         Comments/MDM:     1. Chronic pain of right knee  Discussed with patient imaging results, and that the ordering physician Dr. Pompa will also be contacting him regarding results, discussed DDx, management options (risks,benefits, and alternatives to planned treatment), natural progression.    Supportive care measures were discussed.   Knee brace to stabilize joint wear whenever weightbearing or mobilizing.  May remove at bedtime for sleep  Patient states he will run out of Flexeril before he sees Dr. Hayes his pain management specialist, would like a refill, states he does not want to come back to urgent care again when he runs out.  I did send in a dated refill, instructed him he will not be able to pick it up until 4/13/2024 and this will last him until he sees Dr. Hayes on 4/22/2024.  He states he is agreeable  Topical Lidoderm 5% patch change every 24 hours  Questions were encouraged and answered.  Written information was provided and I did go over this with the patient in clinic today.  Instructed patient regarding red flags and to return to urgent care prn if new or worsening sx or if there is no improvement in condition prn.    Advised the patient to follow-up with the primary care physician for recheck, reevaluation, and consideration of further management.  I did instruct patient regarding medications prescribed, purpose, side effects, precautions.  Instructed patient to get a pharmacy consult when picking up any prescribed medications.  Strict ER precautions discussed for any severe, escalating, uncontrolled pain.   Patient states they have good understanding and they are agreeable with the plan of care.     - cyclobenzaprine (FLEXERIL) 10 mg Tab; Take 1 Tablet by mouth 3 times a day as needed for Moderate Pain or Muscle Spasms.  Dispense: 30 Tablet; Refill: 0         Pt is clinically stable at today's acute urgent care visit. Vital signs are normal and reassuring.  No acute distress noted. Appropriate for outpatient management at this time.        I personally reviewed prior external notes and test results pertinent to today's visit.  I have independently reviewed and interpreted all diagnostics ordered during this urgent care acute visit.        Please note that this dictation was created  using voice recognition software. I have made a reasonable attempt to correct obvious errors, but I expect that there are errors of grammar and possibly content that I did not discover before finalizing the note.    This note was electronically signed by SOPHIE Tinsley, MARCIANO, ADELAIDE

## 2024-04-09 DIAGNOSIS — M25.561 ACUTE PAIN OF RIGHT KNEE: ICD-10-CM

## 2024-07-07 ENCOUNTER — OFFICE VISIT (OUTPATIENT)
Dept: URGENT CARE | Facility: PHYSICIAN GROUP | Age: 54
End: 2024-07-07
Payer: COMMERCIAL

## 2024-07-07 ENCOUNTER — APPOINTMENT (OUTPATIENT)
Dept: RADIOLOGY | Facility: IMAGING CENTER | Age: 54
End: 2024-07-07
Payer: COMMERCIAL

## 2024-07-07 VITALS
RESPIRATION RATE: 16 BRPM | SYSTOLIC BLOOD PRESSURE: 128 MMHG | HEIGHT: 70 IN | HEART RATE: 84 BPM | BODY MASS INDEX: 33.93 KG/M2 | TEMPERATURE: 97.3 F | DIASTOLIC BLOOD PRESSURE: 80 MMHG | OXYGEN SATURATION: 97 % | WEIGHT: 237 LBS

## 2024-07-07 DIAGNOSIS — M43.02 CERVICAL SPONDYLOLYSIS: ICD-10-CM

## 2024-07-07 DIAGNOSIS — M54.2 NECK PAIN: ICD-10-CM

## 2024-07-07 PROCEDURE — 72040 X-RAY EXAM NECK SPINE 2-3 VW: CPT | Mod: TC,FY

## 2024-07-07 PROCEDURE — 99214 OFFICE O/P EST MOD 30 MIN: CPT

## 2024-07-07 PROCEDURE — 3074F SYST BP LT 130 MM HG: CPT

## 2024-07-07 PROCEDURE — 3079F DIAST BP 80-89 MM HG: CPT

## 2024-07-07 RX ORDER — DIAZEPAM 5 MG/1
TABLET ORAL
COMMUNITY
Start: 2024-04-15

## 2024-07-07 RX ORDER — PREDNISONE 20 MG/1
TABLET ORAL
Qty: 11 TABLET | Refills: 0 | Status: SHIPPED | OUTPATIENT
Start: 2024-07-07

## 2024-07-07 RX ORDER — QUETIAPINE FUMARATE 100 MG/1
TABLET, FILM COATED ORAL
COMMUNITY
Start: 2024-06-10

## 2024-07-07 RX ORDER — KETOROLAC TROMETHAMINE 30 MG/ML
15 INJECTION, SOLUTION INTRAMUSCULAR; INTRAVENOUS ONCE
Status: COMPLETED | OUTPATIENT
Start: 2024-07-07 | End: 2024-07-07

## 2024-07-07 RX ORDER — GABAPENTIN 300 MG/1
CAPSULE ORAL
COMMUNITY
Start: 2024-04-15

## 2024-07-07 RX ADMIN — KETOROLAC TROMETHAMINE 15 MG: 30 INJECTION, SOLUTION INTRAMUSCULAR; INTRAVENOUS at 15:15

## 2024-07-07 ASSESSMENT — ENCOUNTER SYMPTOMS
BACK PAIN: 1
MYALGIAS: 0
NUMBNESS: 0
FALLS: 0
NECK PAIN: 1
FEVER: 0
HEADACHES: 0

## 2024-07-07 ASSESSMENT — FIBROSIS 4 INDEX: FIB4 SCORE: 1.56

## 2024-08-19 PROBLEM — M16.11 PRIMARY OSTEOARTHRITIS OF RIGHT HIP: Status: ACTIVE | Noted: 2024-08-19

## 2024-08-27 ENCOUNTER — OFFICE VISIT (OUTPATIENT)
Dept: MEDICAL GROUP | Facility: MEDICAL CENTER | Age: 54
End: 2024-08-27
Payer: COMMERCIAL

## 2024-08-27 VITALS
OXYGEN SATURATION: 96 % | DIASTOLIC BLOOD PRESSURE: 80 MMHG | WEIGHT: 231.4 LBS | SYSTOLIC BLOOD PRESSURE: 118 MMHG | HEIGHT: 70 IN | BODY MASS INDEX: 33.13 KG/M2 | TEMPERATURE: 97.5 F | HEART RATE: 106 BPM

## 2024-08-27 DIAGNOSIS — G89.29 CHRONIC BILATERAL LOW BACK PAIN WITH RIGHT-SIDED SCIATICA: ICD-10-CM

## 2024-08-27 DIAGNOSIS — Z12.5 SCREENING PSA (PROSTATE SPECIFIC ANTIGEN): ICD-10-CM

## 2024-08-27 DIAGNOSIS — M16.11 PRIMARY OSTEOARTHRITIS OF RIGHT HIP: ICD-10-CM

## 2024-08-27 DIAGNOSIS — Z00.00 PREVENTATIVE HEALTH CARE: ICD-10-CM

## 2024-08-27 DIAGNOSIS — M54.41 CHRONIC BILATERAL LOW BACK PAIN WITH RIGHT-SIDED SCIATICA: ICD-10-CM

## 2024-08-27 DIAGNOSIS — E78.2 MIXED HYPERLIPIDEMIA: ICD-10-CM

## 2024-08-27 DIAGNOSIS — F90.0 ATTENTION DEFICIT HYPERACTIVITY DISORDER (ADHD), PREDOMINANTLY INATTENTIVE TYPE: ICD-10-CM

## 2024-08-27 DIAGNOSIS — D68.51 FACTOR V LEIDEN (HCC): ICD-10-CM

## 2024-08-27 PROBLEM — E78.00 PURE HYPERCHOLESTEROLEMIA: Status: ACTIVE | Noted: 2024-08-27

## 2024-08-27 PROBLEM — M54.2 CHRONIC NECK PAIN: Status: ACTIVE | Noted: 2024-08-27

## 2024-08-27 PROCEDURE — 3074F SYST BP LT 130 MM HG: CPT | Performed by: PHYSICIAN ASSISTANT

## 2024-08-27 PROCEDURE — 99214 OFFICE O/P EST MOD 30 MIN: CPT | Performed by: PHYSICIAN ASSISTANT

## 2024-08-27 PROCEDURE — 3079F DIAST BP 80-89 MM HG: CPT | Performed by: PHYSICIAN ASSISTANT

## 2024-08-27 SDOH — ECONOMIC STABILITY: TRANSPORTATION INSECURITY
IN THE PAST 12 MONTHS, HAS THE LACK OF TRANSPORTATION KEPT YOU FROM MEDICAL APPOINTMENTS OR FROM GETTING MEDICATIONS?: NO

## 2024-08-27 SDOH — ECONOMIC STABILITY: INCOME INSECURITY: IN THE LAST 12 MONTHS, WAS THERE A TIME WHEN YOU WERE NOT ABLE TO PAY THE MORTGAGE OR RENT ON TIME?: YES

## 2024-08-27 SDOH — ECONOMIC STABILITY: INCOME INSECURITY: HOW HARD IS IT FOR YOU TO PAY FOR THE VERY BASICS LIKE FOOD, HOUSING, MEDICAL CARE, AND HEATING?: NOT VERY HARD

## 2024-08-27 SDOH — ECONOMIC STABILITY: FOOD INSECURITY: WITHIN THE PAST 12 MONTHS, THE FOOD YOU BOUGHT JUST DIDN'T LAST AND YOU DIDN'T HAVE MONEY TO GET MORE.: NEVER TRUE

## 2024-08-27 SDOH — HEALTH STABILITY: PHYSICAL HEALTH: ON AVERAGE, HOW MANY DAYS PER WEEK DO YOU ENGAGE IN MODERATE TO STRENUOUS EXERCISE (LIKE A BRISK WALK)?: 5 DAYS

## 2024-08-27 SDOH — HEALTH STABILITY: MENTAL HEALTH
STRESS IS WHEN SOMEONE FEELS TENSE, NERVOUS, ANXIOUS, OR CAN'T SLEEP AT NIGHT BECAUSE THEIR MIND IS TROUBLED. HOW STRESSED ARE YOU?: NOT AT ALL

## 2024-08-27 SDOH — ECONOMIC STABILITY: TRANSPORTATION INSECURITY
IN THE PAST 12 MONTHS, HAS LACK OF TRANSPORTATION KEPT YOU FROM MEETINGS, WORK, OR FROM GETTING THINGS NEEDED FOR DAILY LIVING?: NO

## 2024-08-27 SDOH — ECONOMIC STABILITY: FOOD INSECURITY: WITHIN THE PAST 12 MONTHS, YOU WORRIED THAT YOUR FOOD WOULD RUN OUT BEFORE YOU GOT MONEY TO BUY MORE.: NEVER TRUE

## 2024-08-27 SDOH — HEALTH STABILITY: PHYSICAL HEALTH: ON AVERAGE, HOW MANY MINUTES DO YOU ENGAGE IN EXERCISE AT THIS LEVEL?: 150+ MIN

## 2024-08-27 SDOH — ECONOMIC STABILITY: HOUSING INSECURITY
IN THE LAST 12 MONTHS, WAS THERE A TIME WHEN YOU DID NOT HAVE A STEADY PLACE TO SLEEP OR SLEPT IN A SHELTER (INCLUDING NOW)?: YES

## 2024-08-27 SDOH — ECONOMIC STABILITY: TRANSPORTATION INSECURITY
IN THE PAST 12 MONTHS, HAS LACK OF RELIABLE TRANSPORTATION KEPT YOU FROM MEDICAL APPOINTMENTS, MEETINGS, WORK OR FROM GETTING THINGS NEEDED FOR DAILY LIVING?: NO

## 2024-08-27 ASSESSMENT — SOCIAL DETERMINANTS OF HEALTH (SDOH)
WITHIN THE PAST 12 MONTHS, YOU WORRIED THAT YOUR FOOD WOULD RUN OUT BEFORE YOU GOT THE MONEY TO BUY MORE: NEVER TRUE
HOW OFTEN DO YOU HAVE SIX OR MORE DRINKS ON ONE OCCASION: NEVER
HOW OFTEN DO YOU GET TOGETHER WITH FRIENDS OR RELATIVES?: ONCE A WEEK
HOW OFTEN DO YOU HAVE A DRINK CONTAINING ALCOHOL: NEVER
HOW OFTEN DO YOU ATTEND CHURCH OR RELIGIOUS SERVICES?: 1 TO 4 TIMES PER YEAR
HOW MANY DRINKS CONTAINING ALCOHOL DO YOU HAVE ON A TYPICAL DAY WHEN YOU ARE DRINKING: PATIENT DOES NOT DRINK
HOW OFTEN DO YOU ATTENT MEETINGS OF THE CLUB OR ORGANIZATION YOU BELONG TO?: 1 TO 4 TIMES PER YEAR
IN THE PAST 12 MONTHS, HAS THE ELECTRIC, GAS, OIL, OR WATER COMPANY THREATENED TO SHUT OFF SERVICE IN YOUR HOME?: YES
HOW OFTEN DO YOU ATTENT MEETINGS OF THE CLUB OR ORGANIZATION YOU BELONG TO?: 1 TO 4 TIMES PER YEAR
DO YOU BELONG TO ANY CLUBS OR ORGANIZATIONS SUCH AS CHURCH GROUPS UNIONS, FRATERNAL OR ATHLETIC GROUPS, OR SCHOOL GROUPS?: YES
IN A TYPICAL WEEK, HOW MANY TIMES DO YOU TALK ON THE PHONE WITH FAMILY, FRIENDS, OR NEIGHBORS?: MORE THAN THREE TIMES A WEEK
DO YOU BELONG TO ANY CLUBS OR ORGANIZATIONS SUCH AS CHURCH GROUPS UNIONS, FRATERNAL OR ATHLETIC GROUPS, OR SCHOOL GROUPS?: YES
HOW OFTEN DO YOU GET TOGETHER WITH FRIENDS OR RELATIVES?: ONCE A WEEK
HOW HARD IS IT FOR YOU TO PAY FOR THE VERY BASICS LIKE FOOD, HOUSING, MEDICAL CARE, AND HEATING?: NOT VERY HARD
IN A TYPICAL WEEK, HOW MANY TIMES DO YOU TALK ON THE PHONE WITH FAMILY, FRIENDS, OR NEIGHBORS?: MORE THAN THREE TIMES A WEEK
HOW OFTEN DO YOU ATTEND CHURCH OR RELIGIOUS SERVICES?: 1 TO 4 TIMES PER YEAR

## 2024-08-27 ASSESSMENT — LIFESTYLE VARIABLES
SKIP TO QUESTIONS 9-10: 1
AUDIT-C TOTAL SCORE: 0
HOW OFTEN DO YOU HAVE A DRINK CONTAINING ALCOHOL: NEVER
HOW OFTEN DO YOU HAVE SIX OR MORE DRINKS ON ONE OCCASION: NEVER
HOW MANY STANDARD DRINKS CONTAINING ALCOHOL DO YOU HAVE ON A TYPICAL DAY: PATIENT DOES NOT DRINK

## 2024-08-27 ASSESSMENT — PATIENT HEALTH QUESTIONNAIRE - PHQ9: CLINICAL INTERPRETATION OF PHQ2 SCORE: 0

## 2024-08-27 ASSESSMENT — FIBROSIS 4 INDEX: FIB4 SCORE: 1.56

## 2024-08-27 NOTE — PROGRESS NOTES
Subjective:     History of Present Illness  The patient is here today to establish care. He is accompanied by his wife.    He has a history of three major back surgeries, including discectomy and fusion, with the removal of the cage in 2015. He experiences chronic low back pain with sciatica on the right side, which has been ongoing since 1999. He also has herniated disks in his neck, causing significant discomfort in his right arm when lifted. He is currently taking meloxicam and oxycodone for pain management.    He continues to work at a [employer] for the past 8 years, often pulling 12-hour shifts. He is scheduled for a hip replacement due to the absence of cartilage in his right leg, a procedure to be performed by Dr. Rasheed.    His medical history includes Factor V, diagnosed around 2012 or 2013, which led to the development of blood clots in his leg and lungs. He is under the care of Dr. Rivera, a cardiologist, for this condition.    He has ADHD and takes Adderall, which his wife reports has improved his focus. He is also on atorvastatin for cholesterol management, which has successfully reduced his cholesterol levels from 360 to 60.    He is due for colon cancer screening but wishes to postpone it due to his upcoming surgeries. He is also due for labs but does not wish to have them done at this time.      Current medicines (including changes today)  Current Outpatient Medications   Medication Sig Dispense Refill    atorvastatin (LIPITOR) 40 MG Tab Take 40 mg by mouth every evening.      meloxicam (MOBIC) 15 MG tablet Take 1 Tablet by mouth every day. 30 Tablet 0    amphetamine-dextroamphetamine (ADDERALL) 10 MG Tab       ELIQUIS 2.5 MG Tab       oxyCODONE-acetaminophen (PERCOCET-10)  MG Tab Take 1 Tablet by mouth every four hours as needed for Severe Pain.       No current facility-administered medications for this visit.     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    ROS   No chest pain, no shortness of breath, no abdominal pain  Positive ROS as per HPI.  All other systems reviewed and are negative.     Objective:     /80   Pulse (!) 106   Temp 36.4 °C (97.5 °F) (Temporal)   Ht 1.778 m (5' 10\")   Wt 105 kg (231 lb 6.4 oz)   SpO2 96%  Body mass index is 33.2 kg/m².   Physical Exam    Constitutional: Alert, no distress.  Skin: Warm, dry, good turgor, no rashes in visible areas.  Eye: Equal, round and reactive, conjunctiva clear, lids normal.  ENMT: Lips without lesions, good dentition, oropharynx clear.  Neck: Trachea midline, no masses, no thyromegaly.   Respiratory: Unlabored respiratory effort.  Psych: Alert and oriented x3, normal affect and mood.      Results  Laboratory Studies  Cholesterol decreased from 360 to 60.        Assessment and Plan:   The following treatment plan was discussed    Assessment & Plan  1. Establishment of care.  A comprehensive set of labs will be ordered, including cholesterol, diabetes, PSA for prostate, vitamin D, complete blood count, liver, and kidney function tests. These can be completed within the next 6 months.    2. Right hip osteoarthritis.  The patient is scheduled for a hip replacement surgery due to the absence of cartilage in the right hip, resulting in bone-on-bone contact. Dr. Kathya myers [place] is performing the surgery. Pre-screening has been completed over the phone, and an in-person evaluation is scheduled.    3. Chronic low back pain with sciatica.  He has a history of three back surgeries, including discectomy and fusion, with the most recent surgery in 2015. He experiences chronic low back pain with sciatica on the right side. He is advised to continue his current pain management regimen, including meloxicam and oxycodone.    4. Herniated cervical discs.  He has herniated discs in his neck affecting his right arm. He is categorized as \"orange\" for surgery, indicating that surgery is not immediately " necessary but may be required if the condition worsens. Conservative management, including pain control and physical therapy, is recommended.    5. Factor V Leiden thrombophilia.  He is on Eliquis for Factor V Leiden thrombophilia, diagnosed around 8855-4373 after experiencing multiple blood clots in his leg and lungs. He continues to follow up with Dr. Helms, a cardiologist, for management.    6. ADHD.  He is currently taking Adderall for ADHD, which has significantly improved his ability to focus and complete tasks. Continuation of Adderall is recommended.    7. Hyperlipidemia.  He is taking atorvastatin for cholesterol management, which has successfully reduced his cholesterol levels from 360 to 60. Continuation of atorvastatin is advised.    8. Penicillin allergy.  He has a documented allergy to penicillin. Morphine causes itching and discomfort, but he prefers not to list it as an allergy.    Follow-up  A follow-up visit is scheduled in 6 months.      ORDERS:  1. Primary osteoarthritis of right hip      2. Chronic bilateral low back pain with right-sided sciatica      3. Factor V Leiden (HCC)      4. Mixed hyperlipidemia      5. Attention deficit hyperactivity disorder (ADHD), predominantly inattentive type      6. Preventative health care    - CBC WITH DIFFERENTIAL; Future  - Comp Metabolic Panel; Future  - HEMOGLOBIN A1C; Future  - Lipid Profile; Future  - VITAMIN D,25 HYDROXY (DEFICIENCY); Future    7. Screening PSA (prostate specific antigen)    - PROSTATE SPECIFIC AG SCREENING; Future        Please note that this dictation was created using voice recognition software. I have made every reasonable attempt to correct obvious errors, but I expect that there are errors of grammar and possibly content that I did not discover before finalizing the note.      Attestation      Verbal consent was acquired by the patient to use [employer] ambient listening note generation during this visit Yes

## 2024-11-05 ENCOUNTER — HOSPITAL ENCOUNTER (OUTPATIENT)
Dept: LAB | Facility: MEDICAL CENTER | Age: 54
End: 2024-11-05
Attending: NURSE PRACTITIONER
Payer: COMMERCIAL

## 2024-11-05 LAB
BASOPHILS # BLD AUTO: 0.8 % (ref 0–1.8)
BASOPHILS # BLD: 0.06 K/UL (ref 0–0.12)
EOSINOPHIL # BLD AUTO: 0.22 K/UL (ref 0–0.51)
EOSINOPHIL NFR BLD: 2.9 % (ref 0–6.9)
ERYTHROCYTE [DISTWIDTH] IN BLOOD BY AUTOMATED COUNT: 48.6 FL (ref 35.9–50)
ERYTHROCYTE [SEDIMENTATION RATE] IN BLOOD BY WESTERGREN METHOD: 7 MM/HOUR (ref 0–20)
HCT VFR BLD AUTO: 49.2 % (ref 42–52)
HGB BLD-MCNC: 16.5 G/DL (ref 14–18)
IMM GRANULOCYTES # BLD AUTO: 0.04 K/UL (ref 0–0.11)
IMM GRANULOCYTES NFR BLD AUTO: 0.5 % (ref 0–0.9)
LYMPHOCYTES # BLD AUTO: 2.16 K/UL (ref 1–4.8)
LYMPHOCYTES NFR BLD: 28.2 % (ref 22–41)
MCH RBC QN AUTO: 33.2 PG (ref 27–33)
MCHC RBC AUTO-ENTMCNC: 33.5 G/DL (ref 32.3–36.5)
MCV RBC AUTO: 99 FL (ref 81.4–97.8)
MONOCYTES # BLD AUTO: 0.91 K/UL (ref 0–0.85)
MONOCYTES NFR BLD AUTO: 11.9 % (ref 0–13.4)
NEUTROPHILS # BLD AUTO: 4.27 K/UL (ref 1.82–7.42)
NEUTROPHILS NFR BLD: 55.7 % (ref 44–72)
NRBC # BLD AUTO: 0 K/UL
NRBC BLD-RTO: 0 /100 WBC (ref 0–0.2)
PLATELET # BLD AUTO: 231 K/UL (ref 164–446)
PMV BLD AUTO: 10.6 FL (ref 9–12.9)
RBC # BLD AUTO: 4.97 M/UL (ref 4.7–6.1)
WBC # BLD AUTO: 7.7 K/UL (ref 4.8–10.8)

## 2024-11-05 PROCEDURE — 86038 ANTINUCLEAR ANTIBODIES: CPT

## 2024-11-05 PROCEDURE — 86200 CCP ANTIBODY: CPT

## 2024-11-05 PROCEDURE — 36415 COLL VENOUS BLD VENIPUNCTURE: CPT

## 2024-11-05 PROCEDURE — 86431 RHEUMATOID FACTOR QUANT: CPT

## 2024-11-05 PROCEDURE — 85652 RBC SED RATE AUTOMATED: CPT

## 2024-11-05 PROCEDURE — 86140 C-REACTIVE PROTEIN: CPT

## 2024-11-05 PROCEDURE — 85025 COMPLETE CBC W/AUTO DIFF WBC: CPT

## 2024-11-06 LAB
CRP SERPL HS-MCNC: <0.3 MG/DL (ref 0–0.75)
RHEUMATOID FACT SER IA-ACNC: <10 IU/ML (ref 0–14)

## 2024-11-07 LAB
CCP IGA+IGG SERPL IA-ACNC: 2 UNITS (ref 0–19)
NUCLEAR IGG SER QL IA: NORMAL

## 2025-01-06 ENCOUNTER — HOSPITAL ENCOUNTER (OUTPATIENT)
Dept: RADIOLOGY | Facility: MEDICAL CENTER | Age: 55
End: 2025-01-06
Attending: NURSE PRACTITIONER
Payer: COMMERCIAL

## 2025-01-06 DIAGNOSIS — M25.511 CHRONIC RIGHT SHOULDER PAIN: ICD-10-CM

## 2025-01-06 DIAGNOSIS — G89.29 CHRONIC RIGHT SHOULDER PAIN: ICD-10-CM

## 2025-01-06 PROCEDURE — 73221 MRI JOINT UPR EXTREM W/O DYE: CPT | Mod: RT

## 2025-02-24 ENCOUNTER — APPOINTMENT (OUTPATIENT)
Dept: MEDICAL GROUP | Facility: MEDICAL CENTER | Age: 55
End: 2025-02-24
Payer: COMMERCIAL

## 2025-05-26 ENCOUNTER — HOSPITAL ENCOUNTER (OUTPATIENT)
Dept: RADIOLOGY | Facility: MEDICAL CENTER | Age: 55
End: 2025-05-26
Attending: ORTHOPAEDIC SURGERY
Payer: COMMERCIAL

## 2025-05-26 PROCEDURE — 93971 EXTREMITY STUDY: CPT | Mod: RT
